# Patient Record
Sex: FEMALE | Race: WHITE | NOT HISPANIC OR LATINO | Employment: OTHER | URBAN - METROPOLITAN AREA
[De-identification: names, ages, dates, MRNs, and addresses within clinical notes are randomized per-mention and may not be internally consistent; named-entity substitution may affect disease eponyms.]

---

## 2023-09-18 LAB
BASOPHILS (10*3/UL) IN BLOOD BY AUTOMATED COUNT: 0.03 X10E9/L (ref 0–0.1)
BASOPHILS/100 LEUKOCYTES IN BLOOD BY AUTOMATED COUNT: 0.7 % (ref 0–2)
CALCIDIOL (25 OH VITAMIN D3) (NG/ML) IN SER/PLAS: 100 NG/ML
COBALAMIN (VITAMIN B12) (PG/ML) IN SER/PLAS: 663 PG/ML (ref 211–911)
EOSINOPHILS (10*3/UL) IN BLOOD BY AUTOMATED COUNT: 0.09 X10E9/L (ref 0–0.7)
EOSINOPHILS/100 LEUKOCYTES IN BLOOD BY AUTOMATED COUNT: 2 % (ref 0–6)
ERYTHROCYTE DISTRIBUTION WIDTH (RATIO) BY AUTOMATED COUNT: 15.9 % (ref 11.5–14.5)
ERYTHROCYTE MEAN CORPUSCULAR HEMOGLOBIN CONCENTRATION (G/DL) BY AUTOMATED: 31.1 G/DL (ref 32–36)
ERYTHROCYTE MEAN CORPUSCULAR VOLUME (FL) BY AUTOMATED COUNT: 82 FL (ref 80–100)
ERYTHROCYTES (10*6/UL) IN BLOOD BY AUTOMATED COUNT: 5.36 X10E12/L (ref 4–5.2)
ESTIMATED AVERAGE GLUCOSE FOR HBA1C: 103 MG/DL
FERRITIN (UG/LL) IN SER/PLAS: 22 UG/L (ref 8–150)
HEMATOCRIT (%) IN BLOOD BY AUTOMATED COUNT: 44.1 % (ref 36–46)
HEMOGLOBIN (G/DL) IN BLOOD: 13.7 G/DL (ref 12–16)
HEMOGLOBIN A1C/HEMOGLOBIN TOTAL IN BLOOD: 5.2 %
IMMATURE GRANULOCYTES/100 LEUKOCYTES IN BLOOD BY AUTOMATED COUNT: 0.2 % (ref 0–0.9)
IRON (UG/DL) IN SER/PLAS: 109 UG/DL (ref 35–150)
IRON BINDING CAPACITY (UG/DL) IN SER/PLAS: 345 UG/DL (ref 240–445)
IRON SATURATION (%) IN SER/PLAS: 32 % (ref 25–45)
LEUKOCYTES (10*3/UL) IN BLOOD BY AUTOMATED COUNT: 4.6 X10E9/L (ref 4.4–11.3)
LYMPHOCYTES (10*3/UL) IN BLOOD BY AUTOMATED COUNT: 1.95 X10E9/L (ref 1.2–4.8)
LYMPHOCYTES/100 LEUKOCYTES IN BLOOD BY AUTOMATED COUNT: 42.6 % (ref 13–44)
MAGNESIUM (MG/DL) IN SER/PLAS: 2.47 MG/DL (ref 1.6–2.4)
MONOCYTES (10*3/UL) IN BLOOD BY AUTOMATED COUNT: 0.55 X10E9/L (ref 0.1–1)
MONOCYTES/100 LEUKOCYTES IN BLOOD BY AUTOMATED COUNT: 12 % (ref 2–10)
NEUTROPHILS (10*3/UL) IN BLOOD BY AUTOMATED COUNT: 1.95 X10E9/L (ref 1.2–7.7)
NEUTROPHILS/100 LEUKOCYTES IN BLOOD BY AUTOMATED COUNT: 42.5 % (ref 40–80)
PLATELETS (10*3/UL) IN BLOOD AUTOMATED COUNT: 260 X10E9/L (ref 150–450)
THYROTROPIN (MIU/L) IN SER/PLAS BY DETECTION LIMIT <= 0.05 MIU/L: 2.49 MIU/L (ref 0.44–3.98)

## 2023-10-10 ENCOUNTER — ANCILLARY PROCEDURE (OUTPATIENT)
Dept: RADIOLOGY | Facility: HOSPITAL | Age: 68
End: 2023-10-10
Payer: MEDICARE

## 2023-10-10 DIAGNOSIS — R10.12 LEFT UPPER QUADRANT PAIN: ICD-10-CM

## 2023-10-10 PROCEDURE — 76700 US EXAM ABDOM COMPLETE: CPT | Performed by: STUDENT IN AN ORGANIZED HEALTH CARE EDUCATION/TRAINING PROGRAM

## 2023-10-10 PROCEDURE — 76700 US EXAM ABDOM COMPLETE: CPT

## 2023-10-13 ENCOUNTER — HOSPITAL ENCOUNTER (OUTPATIENT)
Dept: RADIOLOGY | Facility: HOSPITAL | Age: 68
Discharge: HOME | End: 2023-10-13
Payer: MEDICARE

## 2023-10-13 DIAGNOSIS — R10.12 LEFT UPPER QUADRANT PAIN: ICD-10-CM

## 2023-10-13 PROCEDURE — 74176 CT ABD & PELVIS W/O CONTRAST: CPT

## 2023-10-13 PROCEDURE — 2550000001 HC RX 255 CONTRASTS

## 2023-10-13 PROCEDURE — A9698 NON-RAD CONTRAST MATERIALNOC: HCPCS

## 2023-10-13 PROCEDURE — 74176 CT ABD & PELVIS W/O CONTRAST: CPT | Performed by: RADIOLOGY

## 2023-10-13 RX ADMIN — IOHEXOL 500 ML: 12 SOLUTION ORAL at 08:50

## 2023-10-14 PROCEDURE — 2550000001 HC RX 255 CONTRASTS

## 2023-10-14 PROCEDURE — A9698 NON-RAD CONTRAST MATERIALNOC: HCPCS

## 2023-10-24 ENCOUNTER — HOSPITAL ENCOUNTER (OUTPATIENT)
Dept: RADIOLOGY | Facility: HOSPITAL | Age: 68
Discharge: HOME | End: 2023-10-24
Payer: MEDICARE

## 2023-10-24 DIAGNOSIS — Z13.820 ENCOUNTER FOR SCREENING FOR OSTEOPOROSIS: ICD-10-CM

## 2023-10-24 DIAGNOSIS — Z12.31 ENCOUNTER FOR SCREENING MAMMOGRAM FOR MALIGNANT NEOPLASM OF BREAST: ICD-10-CM

## 2023-10-24 DIAGNOSIS — M81.0 OSTEOPOROSIS: ICD-10-CM

## 2023-10-24 PROCEDURE — 77085 DXA BONE DENSITY AXL VRT FX: CPT | Performed by: RADIOLOGY

## 2023-10-24 PROCEDURE — 77067 SCR MAMMO BI INCL CAD: CPT

## 2023-10-24 PROCEDURE — 77067 SCR MAMMO BI INCL CAD: CPT | Performed by: RADIOLOGY

## 2023-10-24 PROCEDURE — 77063 BREAST TOMOSYNTHESIS BI: CPT | Performed by: RADIOLOGY

## 2023-10-24 PROCEDURE — 77085 DXA BONE DENSITY AXL VRT FX: CPT

## 2024-03-06 ENCOUNTER — HOSPITAL ENCOUNTER (OUTPATIENT)
Dept: RADIOLOGY | Facility: HOSPITAL | Age: 69
Discharge: HOME | End: 2024-03-06
Payer: MEDICARE

## 2024-03-06 DIAGNOSIS — M79.662 PAIN IN LEFT LOWER LEG: ICD-10-CM

## 2024-03-06 DIAGNOSIS — M79.89 OTHER SPECIFIED SOFT TISSUE DISORDERS: ICD-10-CM

## 2024-03-06 PROCEDURE — 93971 EXTREMITY STUDY: CPT

## 2024-03-14 ENCOUNTER — TELEPHONE (OUTPATIENT)
Dept: HEMATOLOGY/ONCOLOGY | Facility: HOSPITAL | Age: 69
End: 2024-03-14
Payer: MEDICARE

## 2024-03-19 PROBLEM — E61.1 IRON DEFICIENCY: Status: ACTIVE | Noted: 2023-09-07

## 2024-03-19 PROBLEM — D68.51 FACTOR V LEIDEN CARRIER (MULTI): Status: ACTIVE | Noted: 2023-09-07

## 2024-03-19 PROBLEM — K12.1 ORAL ULCER: Status: ACTIVE | Noted: 2020-06-12

## 2024-03-19 PROBLEM — K64.4 EXTERNAL HEMORRHOIDS: Status: ACTIVE | Noted: 2024-03-19

## 2024-03-19 PROBLEM — K21.9 GASTROESOPHAGEAL REFLUX: Status: ACTIVE | Noted: 2019-08-07

## 2024-03-19 PROBLEM — B00.9 HERPES SIMPLEX VIRUS (HSV) INFECTION: Status: ACTIVE | Noted: 2022-05-05

## 2024-03-19 PROBLEM — M85.80 OSTEOPENIA: Status: ACTIVE | Noted: 2024-03-19

## 2024-03-19 PROBLEM — R05.9 COUGH: Status: RESOLVED | Noted: 2024-03-19 | Resolved: 2024-03-19

## 2024-03-19 PROBLEM — M77.02 MEDIAL EPICONDYLITIS, LEFT ELBOW: Status: ACTIVE | Noted: 2020-12-23

## 2024-03-19 PROBLEM — C44.529 SQUAMOUS CELL CARCINOMA OF SKIN OF OTHER PART OF TRUNK: Status: ACTIVE | Noted: 2022-07-15

## 2024-03-19 PROBLEM — Z86.39 HISTORY OF METABOLIC DISORDER: Status: ACTIVE | Noted: 2024-03-19

## 2024-03-19 PROBLEM — R10.32 LEFT LOWER QUADRANT ABDOMINAL PAIN: Status: ACTIVE | Noted: 2023-10-25

## 2024-03-19 PROBLEM — L81.9 ABNORMAL PIGMENTATION OF SKIN: Status: ACTIVE | Noted: 2020-12-23

## 2024-03-19 PROBLEM — R71.8 MICROCYTOSIS: Status: ACTIVE | Noted: 2021-08-16

## 2024-03-19 PROBLEM — Z86.39 HISTORY OF HIGH CHOLESTEROL: Status: ACTIVE | Noted: 2024-03-19

## 2024-03-19 PROBLEM — H92.02 LEFT EAR PAIN: Status: RESOLVED | Noted: 2020-06-12 | Resolved: 2024-03-19

## 2024-03-19 PROBLEM — S29.9XXA RIB INJURY: Status: ACTIVE | Noted: 2024-03-19

## 2024-03-19 PROBLEM — N18.2 STAGE 2 CHRONIC KIDNEY DISEASE: Status: ACTIVE | Noted: 2024-03-19

## 2024-03-19 PROBLEM — R31.9 HEMATURIA: Status: ACTIVE | Noted: 2021-08-18

## 2024-03-19 PROBLEM — N39.0 URINARY TRACT INFECTION: Status: ACTIVE | Noted: 2022-05-05

## 2024-03-19 PROBLEM — Z86.711 HISTORY OF PULMONARY EMBOLISM: Status: ACTIVE | Noted: 2023-09-07

## 2024-03-19 PROBLEM — R53.83 OTHER FATIGUE: Status: RESOLVED | Noted: 2023-09-07 | Resolved: 2024-03-19

## 2024-03-19 PROBLEM — R31.29 MICROSCOPIC HEMATURIA: Status: ACTIVE | Noted: 2021-08-12

## 2024-03-19 PROBLEM — G44.219 EPISODIC TENSION-TYPE HEADACHE: Status: RESOLVED | Noted: 2022-05-13 | Resolved: 2024-03-19

## 2024-03-19 PROBLEM — M54.50 LOW BACK PAIN: Status: ACTIVE | Noted: 2021-08-03

## 2024-03-19 PROBLEM — R10.11 RUQ ABDOMINAL PAIN: Status: ACTIVE | Noted: 2021-08-12

## 2024-03-19 PROBLEM — M17.9 OSTEOARTHRITIS OF KNEE: Status: ACTIVE | Noted: 2024-03-19

## 2024-03-19 PROBLEM — M81.0 AGE-RELATED OSTEOPOROSIS WITHOUT CURRENT PATHOLOGICAL FRACTURE: Status: ACTIVE | Noted: 2024-03-19

## 2024-03-19 PROBLEM — H92.02 OTALGIA OF LEFT EAR: Status: ACTIVE | Noted: 2020-06-12

## 2024-03-19 PROBLEM — D64.9 LOW HEMOGLOBIN: Status: ACTIVE | Noted: 2022-05-16

## 2024-03-19 RX ORDER — MAGNESIUM SULFATE 100 MG
CAPSULE ORAL
COMMUNITY

## 2024-03-19 RX ORDER — APIXABAN 5 MG (74)
KIT ORAL
COMMUNITY
Start: 2024-03-06 | End: 2024-03-27 | Stop reason: ALTCHOICE

## 2024-03-19 RX ORDER — CALCIUM CARB/VITAMIN D3/VIT K1 500-500-40
TABLET,CHEWABLE ORAL EVERY 24 HOURS
COMMUNITY

## 2024-03-19 RX ORDER — ACETAMINOPHEN, DIPHENHYDRAMINE HCL, PHENYLEPHRINE HCL 325; 25; 5 MG/1; MG/1; MG/1
TABLET ORAL EVERY 24 HOURS
COMMUNITY

## 2024-03-19 RX ORDER — NAPROXEN 500 MG/1
TABLET ORAL
COMMUNITY
Start: 2016-04-12 | End: 2024-03-27 | Stop reason: ALTCHOICE

## 2024-03-19 RX ORDER — BENZONATATE 100 MG/1
CAPSULE ORAL
COMMUNITY
Start: 2024-03-06

## 2024-03-27 ENCOUNTER — OFFICE VISIT (OUTPATIENT)
Dept: HEMATOLOGY/ONCOLOGY | Facility: CLINIC | Age: 69
End: 2024-03-27
Payer: MEDICARE

## 2024-03-27 VITALS
RESPIRATION RATE: 16 BRPM | HEIGHT: 64 IN | WEIGHT: 140.21 LBS | SYSTOLIC BLOOD PRESSURE: 129 MMHG | OXYGEN SATURATION: 95 % | DIASTOLIC BLOOD PRESSURE: 83 MMHG | TEMPERATURE: 97.3 F | BODY MASS INDEX: 23.94 KG/M2 | HEART RATE: 66 BPM

## 2024-03-27 DIAGNOSIS — I82.409 DVT (DEEP VENOUS THROMBOSIS) (MULTI): Primary | ICD-10-CM

## 2024-03-27 LAB
ALBUMIN SERPL BCP-MCNC: 3.9 G/DL (ref 3.4–5)
ALP SERPL-CCNC: 82 U/L (ref 33–136)
ALT SERPL W P-5'-P-CCNC: 14 U/L (ref 7–45)
ANION GAP SERPL CALC-SCNC: 11 MMOL/L (ref 10–20)
AST SERPL W P-5'-P-CCNC: 23 U/L (ref 9–39)
BASOPHILS # BLD AUTO: 0.02 X10*3/UL (ref 0–0.1)
BASOPHILS NFR BLD AUTO: 0.4 %
BILIRUB SERPL-MCNC: 0.4 MG/DL (ref 0–1.2)
BUN SERPL-MCNC: 16 MG/DL (ref 6–23)
CALCIUM SERPL-MCNC: 9.2 MG/DL (ref 8.6–10.3)
CHLORIDE SERPL-SCNC: 106 MMOL/L (ref 98–107)
CO2 SERPL-SCNC: 26 MMOL/L (ref 21–32)
CREAT SERPL-MCNC: 0.79 MG/DL (ref 0.5–1.05)
D DIMER PPP FEU-MCNC: 265 NG/ML FEU
EGFRCR SERPLBLD CKD-EPI 2021: 82 ML/MIN/1.73M*2
EOSINOPHIL # BLD AUTO: 0.06 X10*3/UL (ref 0–0.7)
EOSINOPHIL NFR BLD AUTO: 1.3 %
ERYTHROCYTE [DISTWIDTH] IN BLOOD BY AUTOMATED COUNT: 13.5 % (ref 11.5–14.5)
GLUCOSE SERPL-MCNC: 91 MG/DL (ref 74–99)
HCT VFR BLD AUTO: 45.6 % (ref 36–46)
HGB BLD-MCNC: 14.9 G/DL (ref 12–16)
IMM GRANULOCYTES # BLD AUTO: 0.01 X10*3/UL (ref 0–0.7)
IMM GRANULOCYTES NFR BLD AUTO: 0.2 % (ref 0–0.9)
LYMPHOCYTES # BLD AUTO: 1.21 X10*3/UL (ref 1.2–4.8)
LYMPHOCYTES NFR BLD AUTO: 26.9 %
MCH RBC QN AUTO: 28.4 PG (ref 26–34)
MCHC RBC AUTO-ENTMCNC: 32.7 G/DL (ref 32–36)
MCV RBC AUTO: 87 FL (ref 80–100)
MONOCYTES # BLD AUTO: 0.43 X10*3/UL (ref 0.1–1)
MONOCYTES NFR BLD AUTO: 9.6 %
NEUTROPHILS # BLD AUTO: 2.77 X10*3/UL (ref 1.2–7.7)
NEUTROPHILS NFR BLD AUTO: 61.6 %
PLATELET # BLD AUTO: 220 X10*3/UL (ref 150–450)
POTASSIUM SERPL-SCNC: 4 MMOL/L (ref 3.5–5.3)
PROT SERPL-MCNC: 7.2 G/DL (ref 6.4–8.2)
RBC # BLD AUTO: 5.25 X10*6/UL (ref 4–5.2)
SODIUM SERPL-SCNC: 139 MMOL/L (ref 136–145)
WBC # BLD AUTO: 4.5 X10*3/UL (ref 4.4–11.3)

## 2024-03-27 PROCEDURE — 1036F TOBACCO NON-USER: CPT | Performed by: INTERNAL MEDICINE

## 2024-03-27 PROCEDURE — 1159F MED LIST DOCD IN RCRD: CPT | Performed by: INTERNAL MEDICINE

## 2024-03-27 PROCEDURE — 99204 OFFICE O/P NEW MOD 45 MIN: CPT | Performed by: INTERNAL MEDICINE

## 2024-03-27 PROCEDURE — 1126F AMNT PAIN NOTED NONE PRSNT: CPT | Performed by: INTERNAL MEDICINE

## 2024-03-27 PROCEDURE — 36415 COLL VENOUS BLD VENIPUNCTURE: CPT | Performed by: INTERNAL MEDICINE

## 2024-03-27 PROCEDURE — 1160F RVW MEDS BY RX/DR IN RCRD: CPT | Performed by: INTERNAL MEDICINE

## 2024-03-27 PROCEDURE — 99214 OFFICE O/P EST MOD 30 MIN: CPT | Performed by: INTERNAL MEDICINE

## 2024-03-27 ASSESSMENT — ENCOUNTER SYMPTOMS
LOSS OF SENSATION IN FEET: 0
DEPRESSION: 0
OCCASIONAL FEELINGS OF UNSTEADINESS: 0

## 2024-03-27 ASSESSMENT — PATIENT HEALTH QUESTIONNAIRE - PHQ9
SUM OF ALL RESPONSES TO PHQ9 QUESTIONS 1 AND 2: 0
2. FEELING DOWN, DEPRESSED OR HOPELESS: NOT AT ALL
1. LITTLE INTEREST OR PLEASURE IN DOING THINGS: NOT AT ALL

## 2024-03-27 ASSESSMENT — PAIN SCALES - GENERAL: PAINLEVEL: 0-NO PAIN

## 2024-03-27 ASSESSMENT — COLUMBIA-SUICIDE SEVERITY RATING SCALE - C-SSRS
2. HAVE YOU ACTUALLY HAD ANY THOUGHTS OF KILLING YOURSELF?: NO
6. HAVE YOU EVER DONE ANYTHING, STARTED TO DO ANYTHING, OR PREPARED TO DO ANYTHING TO END YOUR LIFE?: NO
1. IN THE PAST MONTH, HAVE YOU WISHED YOU WERE DEAD OR WISHED YOU COULD GO TO SLEEP AND NOT WAKE UP?: NO

## 2024-03-27 NOTE — PROGRESS NOTES
"Patient ID: Flora Sanchez \"Felicita\" is a 68 y.o. female.  Referring Physician: Dell Mcnulty DO  [address] [place] [id number]  Primary Care Provider: Dell Mcnulty DO      Subjective    HPI    68 year old woman with hx of HL, PE/DVT with hx of FVL mutation, osteoporosis, GERD, OA, JONATHAN, SCC of the skin, who is referred for DVT   Previously took Coumadin   She had dopplers on 3/6/24 that was (+) for DVT of the left lower extremity. There is acute appearing occlusive thrombus within the left popliteal vein.  Due for colonoscopy in 2025 , up to date on mammogram   Takes mvi    She retired in 2021   Less active at this time   She had a recent COVID on feb 20th   She had migraines/ body aches and some fever   Had a lot of cough   She had more pain and swelling in the left leg and PCP   He is on Eliquis 5 mg bid   Had some bruising on the feet  She had a hx of DVT in 2001 and was on therapy for vasculitis, she was sedentary for 6 weeks , possibly provoked, she also had a 2 hrs long car drive     Symptoms   PMH/PSH   HL, PE/DVT with hx of FVL mutation, osteoporosis, GERD, HSV, OA, JONATHAN, SCC of the skin    Social history   She worked at the [employer]     Family history   Oldest half brother had a massive PE   No smoking or alcohol   Has 2 children    Review of Systems - Oncology   General: no change in appetite, no chills or fever  HEENT: no hearing loss, no mouth sores, no sore throat  EYES: no eyes problems   Respiratory: no cough, no shortness of breath   CV: no chest pain, no palpitations   GI: no abdominal pain, no diarrhea, no nausea or vomiting  : no difficulty urination, no dysuria or frequency   MSUC: no arthralgias, no back pan   Skin: no itching   Neurological: no dizziness, no headaches   Phychiatric: no anxiety or depression   Hematologic: no bruising, no adenopathy     Objective   BSA: 1.7 meters squared  /83 (BP Location: Left arm)   Pulse 66   Temp 36.3 " "°C (97.3 °F)   Resp 16   Ht (S) 1.638 m (5' 4.49\")   Wt 63.6 kg (140 lb 3.4 oz)   SpO2 95%   BMI 23.70 kg/m²       No family history on file.  Oncology History    No history exists.       Flora Sanchez \"Pat\"  reports that she has never smoked. She has never been exposed to tobacco smoke. She has never used smokeless tobacco.  She  has no history on file for alcohol use.  She  has no history on file for drug use.    Physical Exam  Constitutional: alert, awake and oriented, not in acute distress   HEENT: moist mucous membranes, normal nose   Neck: supple, no lymphadenopathy   EYES: PERRL, EOM intact, conjunctiva normal  CV: normal rate, regular rhythm, no murmur   Pulmonary: normal effort, no wheezing, equal air entry   Abdominal: soft, non tender, non distended, no palpable hepatosplenomegaly   : no CVA tenderness  Skin: no jaundice, rash or erythema  Neurological: AAOx3, no gross focal deficit   Psychiatric: normal mood and behavior   Extremities: b/l LE varicose veins changes left> right, with mild swelling, no calf tenderness   Lymph: no supraclavicular, axillary or inguinal LAD    Performance Status:  Symptomatic; fully ambulatory    Assessment/Plan      Acute DVT     Prior hx of likely provoked DVT from decreased mobility and a likely long car drive   S/p around 5 yrs of warfarin eventually stopped   Reported hx of FVL mutation possibly heterozygous  Now with 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

## 2024-03-27 NOTE — PATIENT INSTRUCTIONS
Today you met with your hematologist/oncologist.  Recent labs were discussed and questions answered.  Scheduling orders were placed.  While we appreciate that you verbalized understanding, if any questions arise after leaving, please do not hesitate to call the office to discuss.  [phone] Thomas Rodriguez.   Dr Evans will have a virtual appointment with you in two weeks to discuss the results of today's labs. Some of those labs take several days to result so we make the appointment so they can be discussed in full. We talked about education on Eliquis and DVTs, if you change your mind and feel you'd like additional resources on those, please call the office. When you have time, please take a moment to review the new patient education provided to you at today's visit.

## 2024-03-28 LAB
B2 GLYCOPROT1 IGA SER-ACNC: 3.2 U/ML
B2 GLYCOPROT1 IGG SER-ACNC: <1.4 U/ML
B2 GLYCOPROT1 IGM SER-ACNC: 1.8 U/ML
CARDIOLIPIN IGA SERPL-ACNC: 3.6 APL U/ML
CARDIOLIPIN IGG SER IA-ACNC: <1.6 GPL U/ML
CARDIOLIPIN IGM SER IA-ACNC: 1.4 MPL U/ML
DRVVT SCREEN TO CONFIRM RATIO: 1.57 RATIO
DRVVT/DRVVT CFM NRMLZD PPP-RTO: 1.72 RATIO
DRVVT/DRVVT CFM P DOAC NEUT NORM PPP-RTO: 0.91 RATIO
LA 2 SCREEN W REFLEX-IMP: NORMAL
NORMALIZED SCT PPP-RTO: 1.13 RATIO
SILICA CLOTTING TIME CONFIRMATION: 1.31 RATIO
SILICA CLOTTING TIME SCREEN: 1.47 RATIO

## 2024-04-01 LAB
ELECTRONICALLY SIGNED BY: ABNORMAL
ELECTRONICALLY SIGNED BY: NORMAL
F2 C.20210G>A GENO BLD/T: NORMAL
F2 C.20210G>A GENO BLD/T: NORMAL
F5 P.R506Q BLD/T QL: ABNORMAL
F5 P.R506Q BLD/T QL: ABNORMAL

## 2024-04-02 LAB
AT III ACT/NOR PPP CHRO: 121 % ACTIVITY (ref 80–130)
PROT C ACT/NOR PPP CHRO: 133 % ACTIVITY (ref 70–150)
PROT S FREE AG ACT/NOR PPP IA: 81 % (ref 55–124)

## 2024-04-10 ENCOUNTER — TELEMEDICINE (OUTPATIENT)
Dept: HEMATOLOGY/ONCOLOGY | Facility: CLINIC | Age: 69
End: 2024-04-10
Payer: MEDICARE

## 2024-04-10 VITALS — BODY MASS INDEX: 23.5 KG/M2 | WEIGHT: 139 LBS

## 2024-04-10 DIAGNOSIS — I82.409 DVT (DEEP VENOUS THROMBOSIS) (MULTI): ICD-10-CM

## 2024-04-10 DIAGNOSIS — D75.1 ERYTHROCYTOSIS: ICD-10-CM

## 2024-04-10 PROCEDURE — 1126F AMNT PAIN NOTED NONE PRSNT: CPT | Performed by: INTERNAL MEDICINE

## 2024-04-10 PROCEDURE — 1160F RVW MEDS BY RX/DR IN RCRD: CPT | Performed by: INTERNAL MEDICINE

## 2024-04-10 PROCEDURE — 99213 OFFICE O/P EST LOW 20 MIN: CPT | Performed by: INTERNAL MEDICINE

## 2024-04-10 PROCEDURE — 1159F MED LIST DOCD IN RCRD: CPT | Performed by: INTERNAL MEDICINE

## 2024-04-10 ASSESSMENT — PAIN SCALES - GENERAL: PAINLEVEL: 0-NO PAIN

## 2024-04-10 NOTE — PROGRESS NOTES
"Patient ID: Flora Sanchez \"Felicita\" is a 68 y.o. female.  Referring Physician: Ady Evans MD  [address] [place] [address]  Primary Care Provider: Dell Mcnulty, DO      Subjective    HPI    68 year old woman with hx of HL, PE/DVT with hx of FVL mutation, osteoporosis, GERD, OA, JONATHAN, SCC of the skin, who is referred for DVT   Previously took Coumadin   She had dopplers on 3/6/24 that was (+) for DVT of the left lower extremity. There is acute appearing occlusive thrombus within the left popliteal vein.  Due for colonoscopy in 2025 , up to date on mammogram   Takes mvi    She retired in 2021   Less active at this time   She had a recent COVID on feb 20th   She had migraines/ body aches and some fever   Had a lot of cough   She had more pain and swelling in the left leg and PCP   He is on Eliquis 5 mg bid   Had some bruising on the feet  She had a hx of DVT in 2001 and was on therapy for vasculitis, she was sedentary for 6 weeks , possibly provoked, she also had a 2 hrs long car drive     Interval history - 4/10/24 -virtual visit    She is feeling very well  She denies any new complaints  The tightness is actually improved in the lower extremity and as she continues to be ambulatory and active  She is tolerating her anticoagulation well and she denies any bleeding events  Denies any hematochezia, melena or hematuria  Breathing is a stable, denies dyspnea on exertion or chest pain  No other new complaints    Symptoms   PMH/PSH   HL, PE/DVT with hx of FVL mutation, osteoporosis, GERD, HSV, OA, JONATHAN, SCC of the skin    Social history   She worked at the [employer]     Family history   Oldest half brother had a massive PE   No smoking or alcohol   Has 2 children    Review of Systems - Oncology   General: no change in appetite, no chills or fever  HEENT: no hearing loss, no mouth sores, no sore throat  EYES: no eyes problems   Respiratory: no cough, no shortness of breath " "  CV: no chest pain, no palpitations   GI: no abdominal pain, no diarrhea, no nausea or vomiting  : no difficulty urination, no dysuria or frequency   MSUC: no arthralgias, no back pan   Skin: no itching   Neurological: no dizziness, no headaches   Phychiatric: no anxiety or depression   Hematologic: no bruising, no adenopathy     Objective   BSA: 1.69 meters squared  Wt 63 kg (139 lb) Comment: patient stated  BMI 23.50 kg/m²       No family history on file.  Oncology History    No history exists.       Flora SHAFFER Helenantony \"Pat\"  reports that she has never smoked. She has never been exposed to tobacco smoke. She has never used smokeless tobacco.  She  has no history on file for alcohol use.  She  has no history on file for drug use.    Physical Exam (not examined today-virtual0  Constitutional: alert, awake and oriented, not in acute distress   HEENT: moist mucous membranes, normal nose   Neck: supple, no lymphadenopathy   EYES: PERRL, EOM intact, conjunctiva normal  CV: normal rate, regular rhythm, no murmur   Pulmonary: normal effort, no wheezing, equal air entry   Abdominal: soft, non tender, non distended, no palpable hepatosplenomegaly   : no CVA tenderness  Skin: no jaundice, rash or erythema  Neurological: AAOx3, no gross focal deficit   Psychiatric: normal mood and behavior   Extremities: b/l LE varicose veins changes left> right, with mild swelling, no calf tenderness   Lymph: no supraclavicular, axillary or inguinal LAD    Performance Status:  Symptomatic; fully ambulatory    Assessment/Plan      Acute DVT     Prior hx of likely provoked DVT from decreased mobility and a likely long car drive   S/p around 5 yrs of warfarin eventually stopped   Reported hx of FVL mutation possibly heterozygous  Now with 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  Erythrocytosis  In the context of her clotting we will check for JAK2 mutation     RTC 3 months    Ady Evans MD

## 2024-04-10 NOTE — PATIENT INSTRUCTIONS
Today you met with your hematologist/oncologist.  Recent labs were discussed and questions answered.  Scheduling orders were placed.  While we appreciate that you verbalized understanding, if any questions arise after leaving, please do not hesitate to call the office to discuss.  [phone] Thomas Rodriguez  A physician assistant will see you in 3 months, Dr Evans has ordered labs for that appointment. Please have those labs drawn up to one week prior to that appointment so the results can be discussed at your visit.

## 2024-07-10 ENCOUNTER — OFFICE VISIT (OUTPATIENT)
Dept: HEMATOLOGY/ONCOLOGY | Facility: CLINIC | Age: 69
End: 2024-07-10
Payer: MEDICARE

## 2024-07-10 VITALS
SYSTOLIC BLOOD PRESSURE: 132 MMHG | OXYGEN SATURATION: 96 % | RESPIRATION RATE: 16 BRPM | TEMPERATURE: 97.5 F | HEART RATE: 101 BPM | BODY MASS INDEX: 23.56 KG/M2 | DIASTOLIC BLOOD PRESSURE: 77 MMHG | WEIGHT: 139.33 LBS

## 2024-07-10 DIAGNOSIS — I82.409 DVT (DEEP VENOUS THROMBOSIS) (MULTI): ICD-10-CM

## 2024-07-10 DIAGNOSIS — D75.1 ERYTHROCYTOSIS: ICD-10-CM

## 2024-07-10 LAB
ALBUMIN SERPL BCP-MCNC: 3.9 G/DL (ref 3.4–5)
ALP SERPL-CCNC: 85 U/L (ref 33–136)
ALT SERPL W P-5'-P-CCNC: 15 U/L (ref 7–45)
ANION GAP SERPL CALC-SCNC: 13 MMOL/L (ref 10–20)
AST SERPL W P-5'-P-CCNC: 21 U/L (ref 9–39)
BASOPHILS # BLD AUTO: 0.02 X10*3/UL (ref 0–0.1)
BASOPHILS NFR BLD AUTO: 0.4 %
BILIRUB SERPL-MCNC: 0.5 MG/DL (ref 0–1.2)
BUN SERPL-MCNC: 22 MG/DL (ref 6–23)
CALCIUM SERPL-MCNC: 9.2 MG/DL (ref 8.6–10.3)
CHLORIDE SERPL-SCNC: 107 MMOL/L (ref 98–107)
CO2 SERPL-SCNC: 23 MMOL/L (ref 21–32)
CREAT SERPL-MCNC: 0.9 MG/DL (ref 0.5–1.05)
D DIMER PPP FEU-MCNC: 310 NG/ML FEU
EGFRCR SERPLBLD CKD-EPI 2021: 70 ML/MIN/1.73M*2
EOSINOPHIL # BLD AUTO: 0.12 X10*3/UL (ref 0–0.7)
EOSINOPHIL NFR BLD AUTO: 2.5 %
ERYTHROCYTE [DISTWIDTH] IN BLOOD BY AUTOMATED COUNT: 13 % (ref 11.5–14.5)
GLUCOSE SERPL-MCNC: 78 MG/DL (ref 74–99)
HCT VFR BLD AUTO: 47.2 % (ref 36–46)
HGB BLD-MCNC: 15.6 G/DL (ref 12–16)
IMM GRANULOCYTES # BLD AUTO: 0.01 X10*3/UL (ref 0–0.7)
IMM GRANULOCYTES NFR BLD AUTO: 0.2 % (ref 0–0.9)
LYMPHOCYTES # BLD AUTO: 1.55 X10*3/UL (ref 1.2–4.8)
LYMPHOCYTES NFR BLD AUTO: 32.3 %
MCH RBC QN AUTO: 28.9 PG (ref 26–34)
MCHC RBC AUTO-ENTMCNC: 33.1 G/DL (ref 32–36)
MCV RBC AUTO: 88 FL (ref 80–100)
MONOCYTES # BLD AUTO: 0.56 X10*3/UL (ref 0.1–1)
MONOCYTES NFR BLD AUTO: 11.7 %
NEUTROPHILS # BLD AUTO: 2.54 X10*3/UL (ref 1.2–7.7)
NEUTROPHILS NFR BLD AUTO: 52.9 %
PLATELET # BLD AUTO: 204 X10*3/UL (ref 150–450)
POTASSIUM SERPL-SCNC: 4.1 MMOL/L (ref 3.5–5.3)
PROT SERPL-MCNC: 6.4 G/DL (ref 6.4–8.2)
RBC # BLD AUTO: 5.39 X10*6/UL (ref 4–5.2)
SODIUM SERPL-SCNC: 139 MMOL/L (ref 136–145)
WBC # BLD AUTO: 4.8 X10*3/UL (ref 4.4–11.3)

## 2024-07-10 PROCEDURE — 99214 OFFICE O/P EST MOD 30 MIN: CPT | Performed by: PHYSICIAN ASSISTANT

## 2024-07-10 PROCEDURE — 36415 COLL VENOUS BLD VENIPUNCTURE: CPT | Performed by: PHYSICIAN ASSISTANT

## 2024-07-10 PROCEDURE — 1126F AMNT PAIN NOTED NONE PRSNT: CPT | Performed by: PHYSICIAN ASSISTANT

## 2024-07-10 PROCEDURE — 1159F MED LIST DOCD IN RCRD: CPT | Performed by: PHYSICIAN ASSISTANT

## 2024-07-10 ASSESSMENT — PAIN SCALES - GENERAL: PAINLEVEL: 0-NO PAIN

## 2024-07-15 NOTE — PROGRESS NOTES
"Patient ID: Flora Sanchez \"Felicita\" is a 68 y.o. female.  Referring Physician: Ady Evans MD  [address] [place] [address]  Primary Care Provider: Dell Mcnulty, DO      Subjective    HPI    68 year old woman with hx of HL, PE/DVT with hx of FVL mutation, osteoporosis, GERD, OA, JONATHAN, SCC of the skin, who is referred for DVT   Previously took Coumadin   She had dopplers on 3/6/24 that was (+) for DVT of the left lower extremity. There is acute appearing occlusive thrombus within the left popliteal vein.  Due for colonoscopy in 2025 , up to date on mammogram   Takes mvi    She retired in 2021   Less active at this time   She had a recent COVID on feb 20th   She had migraines/ body aches and some fever   Had a lot of cough   She had more pain and swelling in the left leg and PCP   He is on Eliquis 5 mg bid   Had some bruising on the feet  She had a hx of DVT in 2001 and was on therapy for vasculitis, she was sedentary for 6 weeks , possibly provoked, she also had a 2 hrs long car drive     Interval history - 7/10/2024  She is feeling very well  She denies any new complaints  The tightness is actually improved in the lower extremity and as she continues to be ambulatory and active  She is tolerating her anticoagulation well and she denies any bleeding events  Denies any hematochezia, melena or hematuria  Breathing is a stable, denies dyspnea on exertion or chest pain  No other new complaints    Symptoms   PMH/PSH   HL, PE/DVT with hx of FVL mutation, osteoporosis, GERD, HSV, OA, JONATHAN, SCC of the skin    Social history   She worked at the [employer]     Family history   Oldest half brother had a massive PE   No smoking or alcohol   Has 2 children    Review of Systems - Oncology   All of the systems have been reviewed and are negative for complaints except what is stated in the HPI and/or past medical history.    Objective       5/5/2022     3:31 PM 5/13/2022    11:05 AM " "8/1/2022    12:00 AM 9/26/2022    12:00 AM 3/27/2024     1:53 PM 4/10/2024     2:58 PM 7/10/2024     9:59 AM   Vitals   Systolic 124 110 110 115 129  132   Diastolic 82 76 70 67 83  77   Heart Rate 80 52 66 65 66  101   Temp 36.6 °C (97.9 °F)    36.3 °C (97.3 °F)  36.4 °C (97.5 °F)   Resp    16 16  16   Height (in) 1.651 m (5' 5\") 1.651 m (5' 5\") 1.651 m (5' 5\") 1.651 m (5' 5\") 1.638 m (5' 4.49\")      Weight (lb) 139 139 140.4 144.4 140.21 139 139.33   BMI 23.13 kg/m2 23.13 kg/m2 23.36 kg/m2 24.03 kg/m2 23.7 kg/m2 23.5 kg/m2 23.56 kg/m2   BSA (m2) 1.7 m2 1.7 m2 1.71 m2 1.73 m2 1.7 m2 1.69 m2 1.7 m2   Visit Report     Report  Report       Significant value     No family history on file.  Oncology History    No history exists.     Flora SHAFFER Laura \"Pat\"  reports that she has never smoked. She has never been exposed to tobacco smoke. She has never used smokeless tobacco.  She  has no history on file for alcohol use.  She  has no history on file for drug use.    Physical Exam   Constitutional: alert, awake and oriented, not in acute distress   HEENT: moist mucous membranes, normal nose   Neck: supple, no lymphadenopathy   EYES: PERRL, EOM intact, conjunctiva normal  Skin: no jaundice, rash or erythema  Neurological: AAOx3, no gross focal deficit   Psychiatric: normal mood and behavior   Extremities: b/l LE varicose veins changes left> right, with mild swelling, no calf tenderness     Assessment/Plan      Acute DVT     Prior hx of likely provoked DVT from decreased mobility and a likely long car drive   S/p around 5 yrs of warfarin eventually stopped   Reported hx of FVL mutation possibly heterozygous  Now with 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    7/10/2024-  -Continue Eliquis till 9/2024 and then can stop  -Discussed taking precautions with traveling, surgeries, wear compression stalkings and stay well hydrated.    2.  Erythrocytosis  In the context of her clotting we will check for JAK2 mutation  F/U My;oid panel     RTC in 12 months    Patient verbalized understanding, and all her questions were answered to her satisfaction    30 min spent with patient greater than 50 % of which was spent in consultation and coordination of care.

## 2024-07-24 LAB
ELECTRONICALLY SIGNED BY: NORMAL
MYELOID NGS RESULTS: NORMAL

## 2025-02-06 ENCOUNTER — OFFICE VISIT (OUTPATIENT)
Dept: URGENT CARE | Age: 70
End: 2025-02-06
Payer: MEDICARE

## 2025-02-06 VITALS
RESPIRATION RATE: 16 BRPM | HEART RATE: 58 BPM | TEMPERATURE: 97.8 F | SYSTOLIC BLOOD PRESSURE: 128 MMHG | WEIGHT: 141 LBS | OXYGEN SATURATION: 98 % | DIASTOLIC BLOOD PRESSURE: 62 MMHG | BODY MASS INDEX: 24.07 KG/M2 | HEIGHT: 64 IN

## 2025-02-06 DIAGNOSIS — L08.9 OPEN WOUND OF FINGER, INFECTED, INITIAL ENCOUNTER: Primary | ICD-10-CM

## 2025-02-06 DIAGNOSIS — S61.209A OPEN WOUND OF FINGER, INFECTED, INITIAL ENCOUNTER: Primary | ICD-10-CM

## 2025-02-06 PROCEDURE — 3008F BODY MASS INDEX DOCD: CPT

## 2025-02-06 PROCEDURE — 99203 OFFICE O/P NEW LOW 30 MIN: CPT

## 2025-02-06 PROCEDURE — 99070 SPECIAL SUPPLIES PHYS/QHP: CPT

## 2025-02-06 PROCEDURE — 1125F AMNT PAIN NOTED PAIN PRSNT: CPT

## 2025-02-06 PROCEDURE — 1159F MED LIST DOCD IN RCRD: CPT

## 2025-02-06 RX ORDER — DOXYCYCLINE 100 MG/1
100 CAPSULE ORAL 2 TIMES DAILY
Qty: 20 CAPSULE | Refills: 0 | Status: SHIPPED | OUTPATIENT
Start: 2025-02-06 | End: 2025-02-16

## 2025-02-06 ASSESSMENT — ENCOUNTER SYMPTOMS
OCCASIONAL FEELINGS OF UNSTEADINESS: 0
LOSS OF SENSATION IN FEET: 0
DEPRESSION: 0

## 2025-02-06 ASSESSMENT — PATIENT HEALTH QUESTIONNAIRE - PHQ9
1. LITTLE INTEREST OR PLEASURE IN DOING THINGS: NOT AT ALL
2. FEELING DOWN, DEPRESSED OR HOPELESS: NOT AT ALL
SUM OF ALL RESPONSES TO PHQ9 QUESTIONS 1 AND 2: 0

## 2025-02-06 ASSESSMENT — PAIN SCALES - GENERAL: PAINLEVEL_OUTOF10: 6

## 2025-02-06 NOTE — PATIENT INSTRUCTIONS
Thank you for visiting  urgent care.      Monitor for signs and symptoms of worsening infection such as increasing redness, swelling, pain, white or yellow drainage from the wound or fever or chills.  If any of these occur follow-up with the emergency department for further evaluation of your symptoms.  Wash twice daily with soap and water and soak in epsom salt warm water twice daily.  Do not submerge in water such as washing dishes or swimming until wound has completely healed.

## 2025-02-06 NOTE — PROGRESS NOTES
"Subjective   Patient ID: Flora Sanchez \"Felicita\" is a 69 y.o. female. They present today with a chief complaint of Other (Cut right 2nd finger on can x 2 weeks ago, red around laceration, swelling, pain).    History of Present Illness  HPI    Past Medical History  Allergies as of 02/06/2025    (No Known Allergies)       (Not in a hospital admission)       No past medical history on file.    No past surgical history on file.     reports that she has never smoked. She has never been exposed to tobacco smoke. She has never used smokeless tobacco.    Review of Systems  Review of Systems                               Objective    Vitals:    02/06/25 1625   BP: 128/62   BP Location: Left arm   Patient Position: Sitting   BP Cuff Size: Adult   Pulse: 58   Resp: 16   Temp: 36.6 °C (97.8 °F)   TempSrc: Oral   SpO2: 98%   Weight: 64 kg (141 lb)   Height: 1.626 m (5' 4\")     No LMP recorded. Patient is postmenopausal.    Physical Exam    Procedures    Point of Care Test & Imaging Results from this visit  No results found for this visit on 02/06/25.   No results found.    Diagnostic study results (if any) were reviewed by Preethi Young PA-C.    Assessment/Plan   Allergies, medications, history, and pertinent labs/EKGs/Imaging reviewed by Preethi Young PA-C.     Medical Decision Making  69 year old female presents with complaint of right index finger pain and swelling.     Orders and Diagnoses  Diagnoses and all orders for this visit:  Open wound of finger, infected, initial encounter  -     doxycycline (Vibramycin) 100 mg capsule; Take 1 capsule (100 mg) by mouth 2 times a day for 10 days. Take with at least 8 ounces (large glass) of water, do not lie down for 30 minutes after  -     Referral to Orthopaedic Surgery; Future      Medical Admin Record      Patient disposition: { Disposition:71006}    Electronically signed by Preethi Young PA-C  4:51 PM      " back: Normal range of motion and neck supple. No rigidity or tenderness.      Comments: Right second digit: small, 3mm superficial appearing wound overlying distal phalanx with surrounding mild soft tissue swelling, tenderness and erythema.  No nail damage.  ROM of joints intact.  No bony tenderness.  No fluctuance.  NVI index finger.  No pain or tenderness of hand other than to index finger particularly distal aspect.     Lymphadenopathy:      Cervical: No cervical adenopathy.   Skin:     General: Skin is warm and dry.      Capillary Refill: Capillary refill takes less than 2 seconds.      Coloration: Skin is not jaundiced or pale.      Findings: No rash.   Neurological:      General: No focal deficit present.      Mental Status: She is alert.      Gait: Gait normal.   Psychiatric:         Mood and Affect: Mood normal.         Behavior: Behavior normal.         Procedures    Point of Care Test & Imaging Results from this visit  No results found for this visit on 02/06/25.   No results found.    Diagnostic study results (if any) were reviewed by Preethi Young PA-C.    Assessment/Plan   Allergies, medications, history, and pertinent labs/EKGs/Imaging reviewed by Preethi Young PA-C.     Medical Decision Making  69 year old female presents with complaint of right index finger pain and swelling. She used a knife to cut open a can because can opener was broken 2 weeks ago and then accidentally cut index finger on the sharp edge of the can.  She states was healing normally until became more painful, red swollen over the past couple of days.  No systemic symptoms.   No paraesthesias.  Not immunocompromised, no DM.  NKDA.  No features of septic tenosynovitis, acute osseous injury, felon, paronychia, sepsis, NV compromise.  Concern for soft tissue infection of original laceration.  Will treat with doxycyline and refer to hand.  Encouraged follow-up with hand referral.  Discussed expected course, indications for  presentation to emergency department.  Discharged good condition agreeable to plan as discussed.     Orders and Diagnoses  Diagnoses and all orders for this visit:  Open wound of finger, infected, initial encounter  -     doxycycline (Vibramycin) 100 mg capsule; Take 1 capsule (100 mg) by mouth 2 times a day for 10 days. Take with at least 8 ounces (large glass) of water, do not lie down for 30 minutes after  -     Referral to Orthopaedic Surgery; Future      Medical Admin Record      Patient disposition: Home    Electronically signed by Preethi Young PA-C  4:51 PM

## 2025-02-09 LAB
BACTERIA SPEC AEROBE CULT: ABNORMAL
BACTERIA SPEC ANAEROBE CULT: ABNORMAL

## 2025-02-12 LAB
BACTERIA SPEC AEROBE CULT: ABNORMAL
BACTERIA SPEC ANAEROBE CULT: ABNORMAL

## 2025-02-20 ENCOUNTER — HOSPITAL ENCOUNTER (OUTPATIENT)
Dept: RADIOLOGY | Facility: CLINIC | Age: 70
Discharge: HOME | End: 2025-02-20
Payer: MEDICARE

## 2025-02-20 ENCOUNTER — OFFICE VISIT (OUTPATIENT)
Dept: ORTHOPEDIC SURGERY | Facility: CLINIC | Age: 70
End: 2025-02-20
Payer: MEDICARE

## 2025-02-20 DIAGNOSIS — L08.9 FINGER INFECTION: ICD-10-CM

## 2025-02-20 DIAGNOSIS — S61.209A OPEN WOUND OF FINGER, INFECTED, INITIAL ENCOUNTER: ICD-10-CM

## 2025-02-20 DIAGNOSIS — L08.9 OPEN WOUND OF FINGER, INFECTED, INITIAL ENCOUNTER: ICD-10-CM

## 2025-02-20 PROCEDURE — 99213 OFFICE O/P EST LOW 20 MIN: CPT | Performed by: ORTHOPAEDIC SURGERY

## 2025-02-20 PROCEDURE — 1036F TOBACCO NON-USER: CPT | Performed by: ORTHOPAEDIC SURGERY

## 2025-02-20 PROCEDURE — 1159F MED LIST DOCD IN RCRD: CPT | Performed by: ORTHOPAEDIC SURGERY

## 2025-02-20 PROCEDURE — 99203 OFFICE O/P NEW LOW 30 MIN: CPT | Performed by: ORTHOPAEDIC SURGERY

## 2025-02-20 PROCEDURE — 73140 X-RAY EXAM OF FINGER(S): CPT | Mod: RT

## 2025-02-20 ASSESSMENT — ENCOUNTER SYMPTOMS
LOSS OF SENSATION IN FEET: 0
DEPRESSION: 0
OCCASIONAL FEELINGS OF UNSTEADINESS: 0

## 2025-02-20 ASSESSMENT — PAIN - FUNCTIONAL ASSESSMENT: PAIN_FUNCTIONAL_ASSESSMENT: NO/DENIES PAIN

## 2025-02-21 NOTE — PROGRESS NOTES
History of Present Illness:  Chief Complaint   Patient presents with    Right Hand - Infection     Right index finger possible infection      Patient presents for evaluation of right index finger infection.  She cut her finger on a metal lid on .  She immediately washed it, but about 2 weeks later began developing some redness and associated pain.  She was started on a course of oral antibiotics..  Swelling and pain have significantly improved.  She was concerned due to the area of dry/dead skin in the area of infection.  There was an open wound at the time of her presentation to the emergency room and a culture was sent demonstrating MSSA.    History reviewed. No pertinent past medical history.    Medication Documentation Review Audit       Reviewed by Irma Arce LPN (Licensed Nurse) on 25 at 1057      Medication Order Taking? Sig Documenting Provider Last Dose Status   apixaban (Eliquis) 5 mg tablet [id number] Yes Take 1 tablet (5 mg) by mouth 2 times a day. Ady Evans MD  Active   benzonatate (Tessalon) 100 mg capsule [id number] Yes  Historical Provider, MD Taking Active   calcium carbonate/vitamin D3 (OYSTER SHELL CALCIUM-VIT D3 ORAL) [id number] Yes once every 24 hours. Historical Provider, MD Taking Active   cholecalciferol (Vitamin D3) 400 unit capsule [id number] Yes once every 24 hours. Historical Provider, MD Taking Active   doxycycline (Vibramycin) 100 mg capsule [id number]  Take 1 capsule (100 mg) by mouth 2 times a day for 10 days. Take with at least 8 ounces (large glass) of water, do not lie down for 30 minutes after Preethi Young PA-C   25 2359   magnesium sulfate 100 mg capsule [id number] Yes magnesium sulfate Historical Provider, MD Taking Active   melatonin 10 mg tablet [id number] Yes Take by mouth once every 24 hours. Historical Provider, MD Taking Active   multivit with minerals/lutein (MULTIVITAMIN 50 PLUS ORAL) [id number] Yes once every 24 hours. Historical  Provider, MD Taking Active                    Not on File    Social History     Socioeconomic History    Marital status:      Spouse name: Not on file    Number of children: Not on file    Years of education: Not on file    Highest education level: Not on file   Occupational History    Not on file   Tobacco Use    Smoking status: Never     Passive exposure: Never    Smokeless tobacco: Never   Substance and Sexual Activity    Alcohol use: Not on file    Drug use: Not on file    Sexual activity: Not on file   Other Topics Concern    Not on file   Social History Narrative    Not on file     Social Drivers of Health     Financial Resource Strain: Low Risk  (10/2/2023)    Received from [facility]    Overall Financial Resource Strain (CARDIA)     Difficulty of Paying Living Expenses: Not hard at all   Food Insecurity: No Food Insecurity (10/2/2023)    Received from [facility]    Hunger Vital Sign     Worried About Running Out of Food in the Last Year: Never true     Ran Out of Food in the Last Year: Never true   Transportation Needs: No Transportation Needs (10/2/2023)    Received from [facility]    PRAPARE - Transportation     Lack of Transportation (Medical): No     Lack of Transportation (Non-Medical): No   Physical Activity: Insufficiently Active (10/2/2023)    Received from [facility]    Exercise Vital Sign     Days of Exercise per Week: 3 days     Minutes of Exercise per Session: 40 min   Stress: Stress Concern Present (10/2/2023)    Received from [facility] - Occupational Stress Questionnaire     Feeling of Stress : To some extent   Social Connections: Unknown (10/2/2023)    Received from [facility]    Social Connection and Isolation Panel [NHANES]     Frequency of Communication with Friends and Family: Twice a week     Frequency of  Social Gatherings with Friends and Family: Once a week     Attends Yarsanism Services: Patient declined     Active Member of Clubs or Organizations: No     Attends Club or Organization Meetings: Never     Marital Status:    Intimate Partner Violence: Not At Risk (10/2/2023)    Received from [facility], [facility]    Humiliation, Afraid, Rape, and Kick questionnaire     Fear of Current or Ex-Partner: No     Emotionally Abused: No     Physically Abused: No     Sexually Abused: No   Housing Stability: Low Risk  (10/2/2023)    Received from [facility], [facility]    Housing Stability Vital Sign     Unable to Pay for Housing in the Last Year: No     Number of Places Lived in the Last Year: 1     Unstable Housing in the Last Year: No       History reviewed. No pertinent surgical history.     Review of Systems   GENERAL: Negative for malaise, significant weight loss, fever  MUSCULOSKELETAL: see HPI  NEURO:  Negative     Physical Examination  Constitutional: Appears well-developed and well-nourished.  Head: Normocephalic and atraumatic.  Eyes: EOMI grossly  Cardiovascular: Intact distal pulses.   Respiratory: Effort normal. No respiratory distress.  Neurologic: Alert and oriented to person, place, and time.  Skin: Skin is warm and dry.  Hematologic / Lymphatic: No lymphedema, lymphangitis.  Psychiatric: normal mood and affect. Behavior is normal.   Musculoskeletal:  Right index finger: No tenderness along flexor sheath.  0 cm DPC.  Able to fully extend digit.  Area of dry skin and a [place] measuring approximately 1 x 1 cm.  This dead skin was sharply debrided with scissors and skin underneath appears healthy without any gross signs of infection.  Capillary refill less than 2 seconds.  Sensation intact.    Radiographs: Right index finger radiographs ordered and available for my review/interpretation: No fracture/dislocation.  Mild degenerative changes at IP joints     Assessment:  Patient with  right index finger resolving infection     Plan:  Nature of the diagnosis was discussed with the patient.  We discussed expected recovery course as well as expectation for some degree of peak reaction given history of infection.  We reviewed warning signs/symptoms of infection and she will return to the emergency room if any acute worsening.  She will follow-up with me if any persistent issues/concerns.  Questions addressed.

## 2025-07-10 ENCOUNTER — TELEMEDICINE (OUTPATIENT)
Dept: HEMATOLOGY/ONCOLOGY | Facility: CLINIC | Age: 70
End: 2025-07-10
Payer: MEDICARE

## 2025-07-10 DIAGNOSIS — I82.409 DVT (DEEP VENOUS THROMBOSIS) (MULTI): ICD-10-CM

## 2025-07-10 DIAGNOSIS — D75.1 ERYTHROCYTOSIS: ICD-10-CM

## 2025-07-10 PROCEDURE — 99214 OFFICE O/P EST MOD 30 MIN: CPT | Performed by: PHYSICIAN ASSISTANT

## 2025-07-10 PROCEDURE — 1036F TOBACCO NON-USER: CPT | Performed by: PHYSICIAN ASSISTANT

## 2025-07-10 NOTE — PROGRESS NOTES
"Visit Type: Benign Heme Follow-up, Virtual Visit:  A Virtual visit (auido only) between the patient (at the originating site) and the provider (at the distant site) was utilized to provide this telehealth service.   Verbal Consent for Encounter: Verbal consent was requested and obtained from patient, or from parent/guardian if minor, on this date for a telehealth visit.     Patient ID: Flora Sanchez \"Felicita\" is a 69 y.o. female.  Referring Physician: Cristin Woods PA-C  [id number] [place] [address]  Primary Care Provider: Yin Basurto,       Subjective    HPI    68 year old woman with hx of HL, PE/DVT with hx of FVL mutation, osteoporosis, GERD, OA, JONATHAN, SCC of the skin, who is referred for DVT   Previously took Coumadin   She had dopplers on 3/6/24 that was (+) for DVT of the left lower extremity. There is acute appearing occlusive thrombus within the left popliteal vein.  Due for colonoscopy in 2025 , up to date on mammogram   Takes mvi    She retired in 2021   Less active at this time   She had a recent COVID on feb 20th   She had migraines/ body aches and some fever   Had a lot of cough   She had more pain and swelling in the left leg and PCP   He is on Eliquis 5 mg bid   Had some bruising on the feet  She had a hx of DVT in 2001 and was on therapy for vasculitis, she was sedentary for 6 weeks , possibly provoked, she also had a 2 hrs long car drive     Interval history - 7/10/2024  She is feeling very well  She denies any new complaints  The tightness is actually improved in the lower extremity and as she continues to be ambulatory and active  She is tolerating her anticoagulation well and she denies any bleeding events  Denies any hematochezia, melena or hematuria  Breathing is a stable, denies dyspnea on exertion or chest pain  No other new complaints    Symptoms   PMH/PSH   HL, PE/DVT with hx of FVL mutation, osteoporosis, GERD, HSV, OA, JONATHAN, SCC of the skin    Social history   She worked at " "the [employer] infusion     Family history   Oldest half brother had a massive PE   No smoking or alcohol   Has 2 children    History of present illness:  Patient presents for follow up. Overall doing well but notes that left foot becomes \"blue and painful\" at times. See by PCP and reports that she had good pulses. Uses compression stocking intermittently. Otherwise doing well.     Review of systems:  All of the systems have been reviewed and are negative for complaints except what is stated in the HPI and/or past medical history.    Objective       5/13/2022    11:05 AM 8/1/2022    12:00 AM 9/26/2022    12:00 AM 3/27/2024     1:53 PM 4/10/2024     2:58 PM 7/10/2024     9:59 AM 2/6/2025     4:25 PM   Vitals   Systolic 110 110 115 129  132 128   Diastolic 76 70 67 83  77 62   BP Location    Left arm  Left arm Left arm   Heart Rate 52 66 65 66  101 58   Temp    36.3 °C (97.3 °F)  36.4 °C (97.5 °F) 36.6 °C (97.8 °F)   Resp   16 16  16 16   Height 1.651 m (5' 5\") 1.651 m (5' 5\") 1.651 m (5' 5\") 1.638 m (5' 4.49\")    1.626 m (5' 4\")   Weight (lb) 139 140.4 144.4 140.21 139 139.33 141   BMI 23.13 kg/m2 23.36 kg/m2 24.03 kg/m2 23.7 kg/m2 23.5 kg/m2 23.56 kg/m2 24.2 kg/m2   BSA (m2) 1.7 m2 1.71 m2 1.73 m2 1.7 m2 1.69 m2 1.7 m2 1.7 m2   Visit Report    Report  Report Report       Significant value     No family history on file.  Oncology History    No history exists.     Flora Sanchez \"Pat\"  reports that she has never smoked. She has never been exposed to tobacco smoke. She has never used smokeless tobacco.  She  reports that she does not currently use alcohol after a past usage of about 1.0 standard drink of alcohol per week.  She  reports no history of drug use.    Physical Exam   Deferred due to telehealth.    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    7/10/2024-  -Continue Eliquis till 9/2024 and then can stop  -Discussed taking precautions with traveling, surgeries, wear compression stalkings and stay well hydrated.    7/10/2025-  Discussed taking precautions with ppx AC before traveling, surgeries, wear compression stalkings and stay well hydrated.   Referral to vascular given for leg    2.  Erythrocytosis  In the context of her clotting we will check for JAK2 mutation  F/U Myloid panel-negative      Can return to routine f/U with PCP; RTC PRN    Patient verbalized understanding, and all her questions were answered to her satisfaction    30 min spent with patient greater than 50 % of which was spent in consultation and coordination of care via telehealth.

## 2025-08-04 ENCOUNTER — APPOINTMENT (OUTPATIENT)
Dept: RADIOLOGY | Facility: HOSPITAL | Age: 70
End: 2025-08-04
Payer: MEDICARE

## 2025-08-04 VITALS — BODY MASS INDEX: 23.73 KG/M2 | HEIGHT: 64 IN | WEIGHT: 139 LBS

## 2025-08-04 DIAGNOSIS — Z12.31 SCREENING MAMMOGRAM FOR BREAST CANCER: ICD-10-CM

## 2025-08-04 PROCEDURE — 77063 BREAST TOMOSYNTHESIS BI: CPT | Performed by: RADIOLOGY

## 2025-08-04 PROCEDURE — 77067 SCR MAMMO BI INCL CAD: CPT | Performed by: RADIOLOGY

## 2025-08-04 PROCEDURE — 77067 SCR MAMMO BI INCL CAD: CPT

## 2025-08-13 ENCOUNTER — OFFICE VISIT (OUTPATIENT)
Dept: CARDIOLOGY | Facility: HOSPITAL | Age: 70
End: 2025-08-13
Payer: MEDICARE

## 2025-08-13 VITALS
BODY MASS INDEX: 24.11 KG/M2 | HEART RATE: 63 BPM | OXYGEN SATURATION: 97 % | DIASTOLIC BLOOD PRESSURE: 52 MMHG | SYSTOLIC BLOOD PRESSURE: 128 MMHG | WEIGHT: 140.43 LBS

## 2025-08-13 DIAGNOSIS — D68.51 FACTOR V LEIDEN CARRIER (MULTI): ICD-10-CM

## 2025-08-13 DIAGNOSIS — Z86.718 PERSONAL HISTORY OF DVT (DEEP VEIN THROMBOSIS): Primary | ICD-10-CM

## 2025-08-13 PROCEDURE — 1036F TOBACCO NON-USER: CPT | Performed by: INTERNAL MEDICINE

## 2025-08-13 PROCEDURE — 1160F RVW MEDS BY RX/DR IN RCRD: CPT | Performed by: INTERNAL MEDICINE

## 2025-08-13 PROCEDURE — 99203 OFFICE O/P NEW LOW 30 MIN: CPT | Performed by: INTERNAL MEDICINE

## 2025-08-13 PROCEDURE — 1159F MED LIST DOCD IN RCRD: CPT | Performed by: INTERNAL MEDICINE

## 2025-08-13 PROCEDURE — 99212 OFFICE O/P EST SF 10 MIN: CPT

## 2026-01-28 ENCOUNTER — APPOINTMENT (OUTPATIENT)
Dept: CARDIOLOGY | Facility: HOSPITAL | Age: 71
End: 2026-01-28
Payer: MEDICARE